# Patient Record
Sex: MALE | Race: ASIAN
[De-identification: names, ages, dates, MRNs, and addresses within clinical notes are randomized per-mention and may not be internally consistent; named-entity substitution may affect disease eponyms.]

---
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
## 2024-07-05 ENCOUNTER — TELEPHONE (OUTPATIENT)
Dept: PRIMARY CARE | Facility: CLINIC | Age: 19
End: 2024-07-05

## 2024-07-05 DIAGNOSIS — U07.1 COVID-19: Primary | ICD-10-CM

## 2024-07-05 RX ORDER — NIRMATRELVIR AND RITONAVIR 300-100 MG
3 KIT ORAL 2 TIMES DAILY
Qty: 30 TABLET | Refills: 0 | Status: SHIPPED | OUTPATIENT
Start: 2024-07-05 | End: 2024-07-10

## 2024-07-05 NOTE — TELEPHONE ENCOUNTER
Patients Mom (Marisol) is calling stating that Wagner took 2 Covid tests on yesterday and they were both positive for Covid. His symptoms are body aches, sore throat, cough congestion and loss of appetite. He is off at [employer] in [place] now. Mom is calling asking if there is a medication that he can take to shorten his symptoms. Mom is asking for a call back please. Please advise. Mom (Marisol)# [phone]. Thank You.